# Patient Record
(demographics unavailable — no encounter records)

---

## 2024-11-18 NOTE — HISTORY OF PRESENT ILLNESS
[FreeTextEntry1] : 67M with HLD for evaluation of PATEL   [place] more "winded" than he usually dose while swimming last week Hard to coordinate his strokes due to use of snorkel mask and neurological impediments  PCP recommended he follow up with cardiologist due to age  May ultimately need neck surgery to help rectify spinal stenosis  Was told he would need cardiac clearance  Patient denies chest pain, palpitations, dizziness, lightheadedness, syncope.  (+) Marijuana. Denies FHx of CAD. [employer]ovisual contractor.   ECG: SB @ 54, no ST-T wave changes

## 2024-11-18 NOTE — DISCUSSION/SUMMARY
[EKG obtained to assist in diagnosis and management of assessed problem(s)] : EKG obtained to assist in diagnosis and management of assessed problem(s) [FreeTextEntry1] : 67M HLD with NEVILLE PATEL   HLD: Lipids from 2023 elevated, . May benefit from statin. would need updated labs. Healthy lifestyle choices encouraged.   [place] PATEL while exercising. Might have been related to certain external conditions: Will arrange for transthoracic echo to ensure normal left ventricular size and function and normal valvular function. Will arrange for treadmill stress test to rule out ischemia and assess for arrhythmias  RV for testing